# Patient Record
Sex: MALE | Race: BLACK OR AFRICAN AMERICAN | NOT HISPANIC OR LATINO | URBAN - METROPOLITAN AREA
[De-identification: names, ages, dates, MRNs, and addresses within clinical notes are randomized per-mention and may not be internally consistent; named-entity substitution may affect disease eponyms.]

---

## 2019-07-23 ENCOUNTER — HISTORICAL (OUTPATIENT)
Dept: ADMINISTRATIVE | Facility: HOSPITAL | Age: 49
End: 2019-07-23

## 2022-05-02 NOTE — HISTORICAL [facility] CERNER
This is a historical note converted from Quentin. Formatting and pictures may have been removed.  Please reference Quentin for original formatting and attached multimedia. Chief Complaint  PT. REPORTS THAT HE HAS BEEN HAVING BILATERAL KNEE PAIN FOR 1 YR. ?NO INJURY. ?XRAY DONE TODAY. ?PT. IS TAKING MOBIC FOR THE PAIN THAT DOESNT HELP. 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 She states the pain ranges from moderate to minimal. ?He states that the pain is a 2 out of 10.? He saw his primary care doctor and they placed him on meloxicam for pain.? He states on his right knee most of his pain is over the lateral aspect.? On his left knee the pain is most on the medial aspect.? He does have some locking and catching in the right knee?with more?activity related pain?in the left?knee.  Review of Systems  GENERAL: No fevers, chills, unexpected weight loss or gain  MUSCULOSKELETAL: Joint pain, extremity pain  Physical Exam  Vitals & Measurements  BP:?134/87?  HT:?177?cm? WT:?97.06?kg? BMI:?30.98?  General: Well-developed, well-nourished.  Neuro: Alert and oriented x 3.  Psych: Normal mood and affect.  CV: Palpable radial pulses.  Resp: Smooth and unlabored.  Skin: No evidence of focal lesions or trauma.  Hem/Imm/Lymph: No evidence of lymphangitis or adenopathy.  Gait: No trendelenburg gait.  DTR: 2+, no hypo or hyperreflexia.  Coordination and Balance: No tremors or abnormal station.  Left?knee Exam:  Varus deformity. Range of motion from 3-120 degrees. Negative patella grind and equal subluxation of knee cap medial and lateral < 1cm. Negative patella tendon tenderness. Negative Lachman and anterior drawer test. Negative posterior drawer test. Negative varus and valgus stress test. Positive medial joint line tenderness. Negative lateral joint line tenderness. 4/5 strength and normal  skin appearance. Sensibility normal.  Right?knee Exam:  No obvious deformity. Range of motion from 0-120 degrees. Negative patella grind and equal subluxation of knee cap medial and lateral < 1cm. Negative patella tendon tenderness. Negative Lachman and anterior drawer test. Negative posterior drawer test. Negative varus and valgus stress test.? Positive lateral joint line tenderness.? Positive McMurrays with lateral sided knee pain.? Positive deep knee flexion been test with lateral sided knee pain.? Negative medial joint line tenderness. 4-/5 strength and normal skin appearance. Sensibility normal.  Assessment/Plan  1.?Osteoarthritis of left knee?M17.12  ?His x-rays demonstrate some medial compartment space narrowing at this time from his previous?meniscus surgery that he had 20+ years ago.? I explained to him that we will do a steroid injection for this knee today?and will consider a?Synvisc injection in the future.  Bilateral?knee injection:  The lateral parapatellar site was prepped and draped in normal sterile fashion. A 25-gauge needle was inserted into that space.? A solution with 1cc of steroid and 2cc of 1% Liodcaine was injected into the knee. Patient tolerated procedure well without any complications.  Ordered:  betamethasone, 12 mg, Intra-Articular, Once, first dose 07/23/19 18:00:00 CDT, stop date 07/23/19 18:00:00 CDT  Lidocaine inj., 2 mL, Intra-Articular, Once, first dose 07/23/19 17:02:00 CDT, stop date 07/23/19 17:02:00 CDT  methylPREDNISolone, = 1 tab(s), Oral, Daily, # 7 tab(s), 0 Refill(s), Pharmacy: [id number]  Office/Outpatient Visit Level 3 New [id number] PC, Osteoarthritis of left knee  [facility] 17:00:00 CDT  ?  2.?Tear of meniscus of right knee?S83.206A  ?I suspect that he has a meniscus tear in his knee.? For now we will do a steroid injection on his knee today. 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 I did also prescribe him a Medrol Dosepak to take before the?national tournament and?August.  Ordered:  betamethasone, 12 mg, Intra-Articular, Once, first dose 07/23/19 18:00:00 CDT, stop date 07/23/19 18:00:00 CDT  Lidocaine inj., 2 mL, Intra-Articular, Once, first dose 07/23/19 17:02:00 CDT, stop date 07/23/19 17:02:00 CDT  methylPREDNISolone, = 1 tab(s), Oral, Daily, # 7 tab(s), 0 Refill(s), Pharmacy: [id number]  Office/Outpatient Visit Level 3 New [id number] PC, Osteoarthritis of left knee  Tear of meniscus of right knee, [facility], 07/23/19 17:00:00 CDT  ?  Orders:  Asp/Inj (Bilateral) [facility] [id number], 07/23/19 17:02:00 CDT, [facility], Routine, 07/23/19 17:02:00 CDT   Problem List/Past Medical History  Ongoing  Obesity  Historical  No qualifying data  Procedure/Surgical History  Knee   Medications  Celestone, 12 mg, Intra-Articular, Once  lidocaine 2% injectable solution, 2 mL, Intra-Articular, Once  Medrol Dosepak 4 mg oral tablet, 1 tab(s), Oral, Daily  meloxicam 15 mg oral tablet, 15 mg= 1 tab(s), Oral, Daily  meloxicam 7.5 mg oral tablet, 7.5 mg= 1 tab(s), Oral, BID,? ?Not taking  methylPREDNISolone 4 mg oral tab,? ?Not taking  Vyvanse 60 mg oral capsule, 60 mg= 1 cap(s), Oral, Daily,? ?Not taking  Vyvanse 70 mg oral capsule, 70 mg= 1 cap(s), Oral, Daily  Allergies  No Known Medication Allergies  Social History  Tobacco  Never (less than 100 in lifetime), N/A, 07/23/2019  Health Maintenance  Health Maintenance  ???Pending?(in the next year)  ??? ??OverDue  ??? ? ? ?Alcohol Misuse Screening due??01/01/19??and every 1??year(s)  ??? ??Due?  ??? ? ? ?ADL Screening due??07/23/19??and every 1??year(s)  ??? ? ? ?Aspirin Therapy for CVD Prevention due??07/23/19??and every 1??year(s)  ??? ? ? ?Tetanus Vaccine due??07/23/19??and every 10??year(s)  ??? ??Due In Future?  ??? ? ? ?Obesity Screening not due until??01/01/20??and every  1??year(s)  ???Satisfied?(in the past 1 year)  ??? ??Satisfied?  ??? ? ? ?Blood Pressure Screening on??07/23/19.??Satisfied by Jane Zaragoza  ??? ? ? ?Body Mass Index Check on??07/23/19.??Satisfied by Jane Zaragoza  ??? ? ? ?Obesity Screening on??07/23/19.??Satisfied by Jane Zaragoza  ?